# Patient Record
Sex: FEMALE | URBAN - METROPOLITAN AREA
[De-identification: names, ages, dates, MRNs, and addresses within clinical notes are randomized per-mention and may not be internally consistent; named-entity substitution may affect disease eponyms.]

---

## 2019-04-25 ENCOUNTER — OFFICE VISIT (OUTPATIENT)
Dept: URBAN - METROPOLITAN AREA CLINIC 23 | Facility: CLINIC | Age: 84
End: 2019-04-25
Payer: MEDICARE

## 2019-04-25 DIAGNOSIS — H04.123 DRY EYE SYNDROME OF BILATERAL LACRIMAL GLANDS: Primary | ICD-10-CM

## 2019-04-25 PROCEDURE — 92014 COMPRE OPH EXAM EST PT 1/>: CPT | Performed by: OPTOMETRIST

## 2019-04-25 ASSESSMENT — INTRAOCULAR PRESSURE
OD: 20
OS: 17

## 2023-05-19 ENCOUNTER — OFFICE VISIT (OUTPATIENT)
Dept: URBAN - METROPOLITAN AREA CLINIC 23 | Facility: CLINIC | Age: 88
End: 2023-05-19
Payer: MEDICARE

## 2023-05-19 DIAGNOSIS — H35.3131 BILATERAL NONEXUDATIVE AGE-RELATED MACULAR DEGENERATION, EARLY DRY STAGE: Primary | ICD-10-CM

## 2023-05-19 PROCEDURE — 99203 OFFICE O/P NEW LOW 30 MIN: CPT | Performed by: OPTOMETRIST

## 2023-05-19 ASSESSMENT — KERATOMETRY
OD: 44.63
OS: 45.75

## 2023-05-19 ASSESSMENT — INTRAOCULAR PRESSURE
OS: 21
OD: 21

## 2023-05-19 NOTE — IMPRESSION/PLAN
Impression: Bilateral nonexudative age-related macular degeneration, early dry stage: H35.3131. Bilateral. Condition: mild. Plan: Discussed findings. Dry AMD accounts for visual decrease, no signs of neovascularization noted today. Discussed AREDS 2 vitamins, patient to discuss with cardiologist whether she is clear to use due to blood thinner usage. If patient cannot use AREDS 2, recommend supplementing with zinc. Patient to return for undilated refraction.

## 2023-07-27 ENCOUNTER — OFFICE VISIT (OUTPATIENT)
Dept: URBAN - METROPOLITAN AREA CLINIC 23 | Facility: CLINIC | Age: 88
End: 2023-07-27

## 2023-07-27 DIAGNOSIS — H52.4 PRESBYOPIA: Primary | ICD-10-CM

## 2023-07-27 ASSESSMENT — KERATOMETRY
OS: 45.63
OD: 44.63

## 2023-07-27 ASSESSMENT — INTRAOCULAR PRESSURE
OD: 20
OS: 20

## 2023-07-27 ASSESSMENT — VISUAL ACUITY
OS: 20/40
OD: 20/40